# Patient Record
Sex: FEMALE | Race: WHITE | NOT HISPANIC OR LATINO
[De-identification: names, ages, dates, MRNs, and addresses within clinical notes are randomized per-mention and may not be internally consistent; named-entity substitution may affect disease eponyms.]

---

## 2021-01-31 PROBLEM — Z00.00 ENCOUNTER FOR PREVENTIVE HEALTH EXAMINATION: Status: ACTIVE | Noted: 2021-01-31

## 2021-02-02 ENCOUNTER — NON-APPOINTMENT (OUTPATIENT)
Age: 51
End: 2021-02-02

## 2021-02-04 ENCOUNTER — APPOINTMENT (OUTPATIENT)
Dept: ENDOCRINOLOGY | Facility: CLINIC | Age: 51
End: 2021-02-04
Payer: COMMERCIAL

## 2021-02-04 VITALS
TEMPERATURE: 98 F | HEART RATE: 116 BPM | SYSTOLIC BLOOD PRESSURE: 168 MMHG | DIASTOLIC BLOOD PRESSURE: 110 MMHG | OXYGEN SATURATION: 97 %

## 2021-02-04 VITALS — HEIGHT: 60.83 IN | WEIGHT: 242.58 LBS | BODY MASS INDEX: 45.8 KG/M2

## 2021-02-04 DIAGNOSIS — Z84.1 FAMILY HISTORY OF DISORDERS OF KIDNEY AND URETER: ICD-10-CM

## 2021-02-04 DIAGNOSIS — Z80.0 FAMILY HISTORY OF MALIGNANT NEOPLASM OF DIGESTIVE ORGANS: ICD-10-CM

## 2021-02-04 DIAGNOSIS — Z82.3 FAMILY HISTORY OF STROKE: ICD-10-CM

## 2021-02-04 DIAGNOSIS — Z77.22 CONTACT WITH AND (SUSPECTED) EXPOSURE TO ENVIRONMENTAL TOBACCO SMOKE (ACUTE) (CHRONIC): ICD-10-CM

## 2021-02-04 DIAGNOSIS — Z83.3 FAMILY HISTORY OF DIABETES MELLITUS: ICD-10-CM

## 2021-02-04 DIAGNOSIS — Z82.49 FAMILY HISTORY OF ISCHEMIC HEART DISEASE AND OTHER DISEASES OF THE CIRCULATORY SYSTEM: ICD-10-CM

## 2021-02-04 DIAGNOSIS — Z78.9 OTHER SPECIFIED HEALTH STATUS: ICD-10-CM

## 2021-02-04 DIAGNOSIS — Z83.79 FAMILY HISTORY OF OTHER DISEASES OF THE DIGESTIVE SYSTEM: ICD-10-CM

## 2021-02-04 DIAGNOSIS — Z86.39 PERSONAL HISTORY OF OTHER ENDOCRINE, NUTRITIONAL AND METABOLIC DISEASE: ICD-10-CM

## 2021-02-04 PROCEDURE — 99205 OFFICE O/P NEW HI 60 MIN: CPT | Mod: 25

## 2021-02-04 PROCEDURE — 99072 ADDL SUPL MATRL&STAF TM PHE: CPT

## 2021-02-05 LAB
BASOPHILS # BLD AUTO: 0.03 K/UL
BASOPHILS NFR BLD AUTO: 0.4 %
EOSINOPHIL # BLD AUTO: 0.07 K/UL
EOSINOPHIL NFR BLD AUTO: 0.9 %
HCT VFR BLD CALC: 43.9 %
HGB BLD-MCNC: 13.8 G/DL
IMM GRANULOCYTES NFR BLD AUTO: 0.3 %
LYMPHOCYTES # BLD AUTO: 2.32 K/UL
LYMPHOCYTES NFR BLD AUTO: 30.8 %
MAN DIFF?: NORMAL
MCHC RBC-ENTMCNC: 27.4 PG
MCHC RBC-ENTMCNC: 31.4 GM/DL
MCV RBC AUTO: 87.1 FL
MONOCYTES # BLD AUTO: 0.4 K/UL
MONOCYTES NFR BLD AUTO: 5.3 %
NEUTROPHILS # BLD AUTO: 4.69 K/UL
NEUTROPHILS NFR BLD AUTO: 62.3 %
PLATELET # BLD AUTO: 289 K/UL
RBC # BLD: 5.04 M/UL
RBC # FLD: 13.1 %
WBC # FLD AUTO: 7.53 K/UL

## 2021-02-06 LAB
ALBUMIN SERPL ELPH-MCNC: 4.6 G/DL
ALP BLD-CCNC: 128 U/L
ALT SERPL-CCNC: 16 U/L
ANION GAP SERPL CALC-SCNC: 15 MMOL/L
AST SERPL-CCNC: 12 U/L
BILIRUB SERPL-MCNC: 0.3 MG/DL
BUN SERPL-MCNC: 12 MG/DL
CALCIUM SERPL-MCNC: 9.6 MG/DL
CHLORIDE SERPL-SCNC: 103 MMOL/L
CHOLEST SERPL-MCNC: 249 MG/DL
CO2 SERPL-SCNC: 22 MMOL/L
CREAT SERPL-MCNC: 0.71 MG/DL
GLUCOSE SERPL-MCNC: 176 MG/DL
HDLC SERPL-MCNC: 50 MG/DL
LDLC SERPL CALC-MCNC: 179 MG/DL
NONHDLC SERPL-MCNC: 199 MG/DL
POTASSIUM SERPL-SCNC: 4.5 MMOL/L
PROT SERPL-MCNC: 7 G/DL
SODIUM SERPL-SCNC: 140 MMOL/L
T3 SERPL-MCNC: 142 NG/DL
T4 FREE SERPL-MCNC: 1.1 NG/DL
THYROGLOB AB SERPL-ACNC: <20 IU/ML
THYROPEROXIDASE AB SERPL IA-ACNC: 215 IU/ML
TRIGL SERPL-MCNC: 97 MG/DL
TSH SERPL-ACNC: 0.75 UIU/ML
VIT B12 SERPL-MCNC: 489 PG/ML

## 2021-02-07 PROBLEM — Z83.3 FAMILY HISTORY OF DIABETES MELLITUS: Status: ACTIVE | Noted: 2021-02-04

## 2021-02-07 PROBLEM — Z77.22 HISTORY OF SECOND HAND SMOKE EXPOSURE: Status: ACTIVE | Noted: 2021-02-04

## 2021-02-07 PROBLEM — Z84.1 FAMILY HISTORY OF KIDNEY DISEASE: Status: ACTIVE | Noted: 2021-02-04

## 2021-02-07 PROBLEM — Z82.49 FAMILY HISTORY OF MYOCARDIAL INFARCTION: Status: ACTIVE | Noted: 2021-02-04

## 2021-02-07 PROBLEM — Z80.0 FAMILY HISTORY OF MALIGNANT NEOPLASM OF COLON: Status: ACTIVE | Noted: 2021-02-04

## 2021-02-07 PROBLEM — Z86.39 HISTORY OF DIABETES MELLITUS: Status: RESOLVED | Noted: 2021-02-04 | Resolved: 2021-02-07

## 2021-02-07 PROBLEM — Z84.1 FAMILY HISTORY OF RENAL FAILURE: Status: ACTIVE | Noted: 2021-02-04

## 2021-02-07 PROBLEM — Z82.3 FAMILY HISTORY OF CEREBROVASCULAR ACCIDENT (CVA): Status: ACTIVE | Noted: 2021-02-04

## 2021-02-07 PROBLEM — Z82.49 FAMILY HISTORY OF HYPERTENSION: Status: ACTIVE | Noted: 2021-02-04

## 2021-02-07 PROBLEM — Z83.79 FAMILY HISTORY OF CHRONIC LIVER DISEASE: Status: ACTIVE | Noted: 2021-02-04

## 2021-02-07 PROBLEM — Z78.9 NON-SMOKER: Status: ACTIVE | Noted: 2021-02-04

## 2021-02-07 RX ORDER — METFORMIN HYDROCHLORIDE 500 MG/1
500 TABLET, COATED ORAL
Refills: 0 | Status: ACTIVE | COMMUNITY

## 2021-02-07 NOTE — REVIEW OF SYSTEMS
[Recent Weight Gain (___ Lbs)] : recent weight gain: [unfilled] lbs [Hair Loss] : hair loss [Stress] : stress [All other systems negative] : All other systems negative

## 2021-02-08 LAB
ESTIMATED AVERAGE GLUCOSE: 192 MG/DL
HBA1C MFR BLD HPLC: 8.3 %
PANC ISLET CELL AB SER QL: NORMAL
TSI ACT/NOR SER: 0.74 IU/L

## 2021-02-11 DIAGNOSIS — E11.65 TYPE 2 DIABETES MELLITUS WITH HYPERGLYCEMIA: ICD-10-CM

## 2021-02-12 ENCOUNTER — NON-APPOINTMENT (OUTPATIENT)
Age: 51
End: 2021-02-12

## 2021-02-12 LAB
GAD65 AB SER-MCNC: 0.06 NMOL/L
ISLET CELL512 AB SER-SCNC: 0 NMOL/L

## 2021-02-12 RX ORDER — METHIMAZOLE 5 MG/1
5 TABLET ORAL
Qty: 45 | Refills: 0 | Status: COMPLETED | COMMUNITY
Start: 1900-01-01 | End: 2021-05-13

## 2021-02-12 NOTE — REASON FOR VISIT
[Consultation] : a consultation visit [Hyperthyroidism] : hyperthyroidism [DM Type 2] : DM Type 2 [FreeTextEntry2] : Dr. Can

## 2021-02-12 NOTE — ASSESSMENT
[FreeTextEntry1] : This is a 50-year-old female with type 2 diabetes mellitus, hyperlipidemia, morbid obesity, hyperthyroidism, COVID-19 infection, here for consultation.\par 1.  T2DM\par Check hemoglobin A1c.\par Check vitamin B12 as she is on Metformin.\par She would benefit from GLP-1 agonist for potential benefit of weight loss.  She would like to remain on only Metformin at this time.\par She does not want an appointment with CDE at this time.\par Urine microalbumin was recently normal.  She denies nephropathy.\par She denies neuropathy.\par Her last ophthalmology exam was in 2019 and she denies retinopathy.  She was advised to make a follow-up appointment.\par 2.  Hyperlipidemia\par LDL has been markedly elevated in the past.  Check lipid panel.\par She refuses statin at this time.\par 3.  Morbid obesity\par Lifestyle modification.\par 4.  Hypothyroidism\par Likely due to Graves' disease.  Check TSI antibodies.\par Check TFTs.\par Thyroid ultrasound in the past showed scattered subcentimeter thyroid nodules.  Most recent thyroid ultrasound showed heterogeneous thyroid gland without discrete focal nodule.\par She is on methimazole 5 mg daily.  Check CBC and CMP.\par Definitive treatment of hyperthyroidism reviewed.\par She does not wish to undergo WILDE at this time.\par She is considering thyroidectomy.\par Referred to Dr. Rao.

## 2021-02-12 NOTE — CONSULT LETTER
[Dear  ___] : Dear  [unfilled], [Consult Letter:] : I had the pleasure of evaluating your patient, [unfilled]. [Please see my note below.] : Please see my note below. [Consult Closing:] : Thank you very much for allowing me to participate in the care of this patient.  If you have any questions, please do not hesitate to contact me. [Sincerely,] : Sincerely, [FreeTextEntry3] : Liberty Taylor MD, FACE\par

## 2021-02-12 NOTE — HISTORY OF PRESENT ILLNESS
[FreeTextEntry1] : CC: Hyperthyroidism\par This is a 50-year-old female with type 2 diabetes mellitus, hyperlipidemia, morbid obesity, hyperthyroidism, COVID-19 infection, here for consultation.\par Type 2 diabetes was diagnosed at the age of 48.  She is currently on metformin 500 mg daily.  She reports that she started shaking when she increased her dose further.  She denies a history of gestational diabetes. She does not SMBG at home. \par Her last ophthalmology exam was in 2019 and she denies retinopathy.  She denies neuropathy.  She denies nephropathy.\par She was diagnosed with hyperthyroidism in 2008.  Thyroid uptake and scan on November 6, 2008 showed 24-hour uptake 48.4%, heterogeneous thyroid with areas of increased activity with adjacent areas of decreased activity suggestive of multinodular goiter.  She was treated with methimazole for approximately 1 year at that time.  She reports that she developed hyperthyroidism after experiencing extreme stress at work.\par Hyperthyroidism recurred in 2015 and she took methimazole for approximately 1 year at that time as well.  She reports that she lost both parents within 1 year which precipitated hyperthyroidism.\par She had COVID-19 infection in March 2020 and developed hyperthyroidism in June 2020.  She is now on methimazole 5 mg daily.\par

## 2021-02-12 NOTE — PHYSICAL EXAM
[Alert] : alert [Well Nourished] : well nourished [Healthy Appearance] : healthy appearance [Obese] : obese [No Acute Distress] : no acute distress [Well Developed] : well developed [Normal Voice/Communication] : normal voice communication [Normal Sclera/Conjunctiva] : normal sclera/conjunctiva [No Proptosis] : no proptosis [No Neck Mass] : no neck mass was observed [No LAD] : no lymphadenopathy [Supple] : the neck was supple [Thyroid Not Enlarged] : the thyroid was not enlarged [No Thyroid Nodules] : no palpable thyroid nodules [No Respiratory Distress] : no respiratory distress [No Accessory Muscle Use] : no accessory muscle use [Normal Rate] : heart rate was normal [Normal Rate and Effort] : normal respiratory rate and effort [No Edema] : no peripheral edema [Pedal Pulses Normal] : the pedal pulses are present [No Stigmata of Cushings Syndrome] : no stigmata of Cushings Syndrome [Normal Gait] : normal gait [No Clubbing, Cyanosis] : no clubbing  or cyanosis of the fingernails [No Involuntary Movements] : no involuntary movements were seen [Right Foot Was Examined] : right foot ~C was examined [Left Foot Was Examined] : left foot ~C was examined [Normal] : normal [2+] : 2+ in the dorsalis pedis [No Tremors] : no tremors [Normal Sensation on Monofilament Testing] : normal sensation on monofilament testing of lower extremities [Normal Affect] : the affect was normal [Normal Insight/Judgement] : insight and judgment were intact [Normal Mood] : the mood was normal [Acanthosis Nigricans] : no acanthosis nigricans [Diminished Throughout Both Feet] : normal tactile sensation with monofilament testing throughout both feet

## 2021-02-15 LAB — ZINC TRANSPORTER 8 AB: <15 U/ML

## 2021-03-08 ENCOUNTER — APPOINTMENT (OUTPATIENT)
Dept: ENDOCRINOLOGY | Facility: CLINIC | Age: 51
End: 2021-03-08
Payer: COMMERCIAL

## 2021-03-08 VITALS
WEIGHT: 237.41 LBS | HEART RATE: 95 BPM | SYSTOLIC BLOOD PRESSURE: 135 MMHG | BODY MASS INDEX: 46 KG/M2 | TEMPERATURE: 97.1 F | DIASTOLIC BLOOD PRESSURE: 89 MMHG | HEIGHT: 60.24 IN | OXYGEN SATURATION: 98 %

## 2021-03-08 DIAGNOSIS — E13.9 OTHER SPECIFIED DIABETES MELLITUS W/OUT COMPLICATIONS: ICD-10-CM

## 2021-03-08 DIAGNOSIS — E78.5 HYPERLIPIDEMIA, UNSPECIFIED: ICD-10-CM

## 2021-03-08 DIAGNOSIS — E66.01 MORBID (SEVERE) OBESITY DUE TO EXCESS CALORIES: ICD-10-CM

## 2021-03-08 LAB
ALBUMIN SERPL ELPH-MCNC: 4.6 G/DL
ALP BLD-CCNC: 120 U/L
ALT SERPL-CCNC: 15 U/L
ANION GAP SERPL CALC-SCNC: 11 MMOL/L
AST SERPL-CCNC: 16 U/L
BASOPHILS # BLD AUTO: 0.02 K/UL
BASOPHILS NFR BLD AUTO: 0.3 %
BILIRUB SERPL-MCNC: 0.4 MG/DL
BUN SERPL-MCNC: 13 MG/DL
C PEPTIDE SERPL-MCNC: 4.6 NG/ML
CALCIUM SERPL-MCNC: 9.9 MG/DL
CHLORIDE SERPL-SCNC: 103 MMOL/L
CO2 SERPL-SCNC: 27 MMOL/L
CREAT SERPL-MCNC: 0.73 MG/DL
EOSINOPHIL # BLD AUTO: 0.08 K/UL
EOSINOPHIL NFR BLD AUTO: 1.1 %
GLUCOSE SERPL-MCNC: 143 MG/DL
HCT VFR BLD CALC: 44 %
HGB BLD-MCNC: 14 G/DL
IMM GRANULOCYTES NFR BLD AUTO: 0.3 %
LYMPHOCYTES # BLD AUTO: 2.63 K/UL
LYMPHOCYTES NFR BLD AUTO: 35.4 %
MAN DIFF?: NORMAL
MCHC RBC-ENTMCNC: 27.3 PG
MCHC RBC-ENTMCNC: 31.8 GM/DL
MCV RBC AUTO: 85.9 FL
MONOCYTES # BLD AUTO: 0.43 K/UL
MONOCYTES NFR BLD AUTO: 5.8 %
NEUTROPHILS # BLD AUTO: 4.24 K/UL
NEUTROPHILS NFR BLD AUTO: 57.1 %
PLATELET # BLD AUTO: 277 K/UL
POTASSIUM SERPL-SCNC: 4.7 MMOL/L
PROT SERPL-MCNC: 7.3 G/DL
RBC # BLD: 5.12 M/UL
RBC # FLD: 12.6 %
SODIUM SERPL-SCNC: 140 MMOL/L
T4 FREE SERPL-MCNC: 1.1 NG/DL
TSH SERPL-ACNC: 0.48 UIU/ML
WBC # FLD AUTO: 7.42 K/UL

## 2021-03-08 PROCEDURE — 99213 OFFICE O/P EST LOW 20 MIN: CPT | Mod: 25

## 2021-03-08 PROCEDURE — 99072 ADDL SUPL MATRL&STAF TM PHE: CPT

## 2021-03-09 PROBLEM — E66.01 MORBID OBESITY WITH BMI OF 45.0-49.9, ADULT: Status: ACTIVE | Noted: 2021-02-07

## 2021-03-09 PROBLEM — E78.5 HYPERLIPIDEMIA: Status: ACTIVE | Noted: 2021-02-06

## 2021-03-09 PROBLEM — E13.9 LADA (LATENT AUTOIMMUNE DIABETES MELLITUS IN ADULTS): Status: ACTIVE | Noted: 2021-02-11

## 2021-03-09 NOTE — REVIEW OF SYSTEMS
[Recent Weight Loss (___ Lbs)] : recent weight loss: [unfilled] lbs [Dry Skin] : dry skin [All other systems negative] : All other systems negative

## 2021-03-09 NOTE — PHYSICAL EXAM
[Alert] : alert [Well Nourished] : well nourished [Healthy Appearance] : healthy appearance [Obese] : obese [No Acute Distress] : no acute distress [Well Developed] : well developed [Normal Voice/Communication] : normal voice communication [Normal Sclera/Conjunctiva] : normal sclera/conjunctiva [No Proptosis] : no proptosis [No Neck Mass] : no neck mass was observed [No LAD] : no lymphadenopathy [Supple] : the neck was supple [Thyroid Not Enlarged] : the thyroid was not enlarged [No Thyroid Nodules] : no palpable thyroid nodules [No Respiratory Distress] : no respiratory distress [No Accessory Muscle Use] : no accessory muscle use [Normal Rate and Effort] : normal respiratory rate and effort [Normal Rate] : heart rate was normal [No Edema] : no peripheral edema [Pedal Pulses Normal] : the pedal pulses are present [No Stigmata of Cushings Syndrome] : no stigmata of Cushings Syndrome [Normal Gait] : normal gait [No Clubbing, Cyanosis] : no clubbing  or cyanosis of the fingernails [No Involuntary Movements] : no involuntary movements were seen [Acanthosis Nigricans] : no acanthosis nigricans [Right Foot Was Examined] : right foot ~C was examined [Left Foot Was Examined] : left foot ~C was examined [Normal] : normal [2+] : 2+ in the dorsalis pedis [Diminished Throughout Both Feet] : normal tactile sensation with monofilament testing throughout both feet [No Tremors] : no tremors [Normal Sensation on Monofilament Testing] : normal sensation on monofilament testing of lower extremities [Normal Affect] : the affect was normal [Normal Insight/Judgement] : insight and judgment were intact [Normal Mood] : the mood was normal

## 2021-03-09 NOTE — ASSESSMENT
[FreeTextEntry1] : This is a 50-year-old female with RAHEEM, hyperlipidemia, morbid obesity, Hashimoto's thyroiditis, Graves' disease, hyperthyroidism, COVID-19 infection, here for consultation.\par 1.  Hyperthyroidism due to Graves' disease\par Check TFTs.\par Thyroid ultrasound in the past showed scattered subcentimeter thyroid nodules.  Most recent thyroid ultrasound showed heterogeneous thyroid gland without discrete focal nodule.\par She is on methimazole 5 mg every other day.  Check CBC and CMP.\par Definitive treatment of hyperthyroidism reviewed.\par She does not wish to undergo WILDE or thyroidectomy at this time.  She prefers to remain on methimazole for now.  Side effects reviewed.\par 2.  RAHEEM\par Check C-peptide.  \par She prefers to remain on Metformin.  Vitamin B12 level is normal.\par She does not want an appointment with CDE at this time.\par Urine microalbumin was recently normal.  She denies nephropathy.\par She denies neuropathy.\par Her last ophthalmology exam was in 2019 and she denies retinopathy.  She was advised to make a follow-up appointment.\par 2.  Hyperlipidemia\par LDL is markedly elevated.\par She refuses statin at this time.\par 3.  Morbid obesity\par Continue with lifestyle modification.\par

## 2021-03-16 ENCOUNTER — NON-APPOINTMENT (OUTPATIENT)
Age: 51
End: 2021-03-16

## 2021-03-16 LAB — GAD65 AB SER-MCNC: 0.04 NMOL/L

## 2021-03-22 ENCOUNTER — APPOINTMENT (OUTPATIENT)
Dept: ENDOCRINOLOGY | Facility: CLINIC | Age: 51
End: 2021-03-22
Payer: COMMERCIAL

## 2021-03-22 PROCEDURE — G0108 DIAB MANAGE TRN  PER INDIV: CPT

## 2021-03-22 PROCEDURE — 99072 ADDL SUPL MATRL&STAF TM PHE: CPT

## 2021-04-12 ENCOUNTER — APPOINTMENT (OUTPATIENT)
Dept: ENDOCRINOLOGY | Facility: CLINIC | Age: 51
End: 2021-04-12
Payer: COMMERCIAL

## 2021-04-12 VITALS
HEART RATE: 83 BPM | TEMPERATURE: 98.4 F | HEIGHT: 60.24 IN | WEIGHT: 230.03 LBS | DIASTOLIC BLOOD PRESSURE: 86 MMHG | OXYGEN SATURATION: 96 % | SYSTOLIC BLOOD PRESSURE: 132 MMHG | BODY MASS INDEX: 44.57 KG/M2

## 2021-04-12 DIAGNOSIS — R76.8 OTHER SPECIFIED ABNORMAL IMMUNOLOGICAL FINDINGS IN SERUM: ICD-10-CM

## 2021-04-12 DIAGNOSIS — E05.00 THYROTOXICOSIS WITH DIFFUSE GOITER W/OUT THYROTOXIC CRISIS OR STORM: ICD-10-CM

## 2021-04-12 DIAGNOSIS — E06.3 AUTOIMMUNE THYROIDITIS: ICD-10-CM

## 2021-04-12 DIAGNOSIS — E05.90 THYROTOXICOSIS, UNSPECIFIED W/OUT THYROTOXIC CRISIS OR STORM: ICD-10-CM

## 2021-04-12 DIAGNOSIS — I10 ESSENTIAL (PRIMARY) HYPERTENSION: ICD-10-CM

## 2021-04-12 PROCEDURE — 99213 OFFICE O/P EST LOW 20 MIN: CPT | Mod: 25

## 2021-04-12 PROCEDURE — 99072 ADDL SUPL MATRL&STAF TM PHE: CPT

## 2021-04-15 LAB
T3 SERPL-MCNC: 120 NG/DL
T4 FREE SERPL-MCNC: 1 NG/DL
TSH SERPL-ACNC: 0.8 UIU/ML

## 2021-04-16 PROBLEM — E05.00 GRAVES' DISEASE: Status: ACTIVE | Noted: 2021-02-08

## 2021-04-16 PROBLEM — R76.8 POSITIVE GAD ANTIBODY: Status: ACTIVE | Noted: 2021-04-16

## 2021-04-16 PROBLEM — E05.90 HYPERTHYROIDISM: Status: ACTIVE | Noted: 2021-02-04

## 2021-04-16 PROBLEM — E06.3 HASHIMOTO'S THYROIDITIS: Status: ACTIVE | Noted: 2021-02-06

## 2021-04-16 PROBLEM — I10 BENIGN ESSENTIAL HYPERTENSION: Status: ACTIVE | Noted: 2021-04-16

## 2021-04-16 RX ORDER — ROSUVASTATIN CALCIUM 5 MG/1
5 TABLET, FILM COATED ORAL
Refills: 0 | Status: ACTIVE | COMMUNITY
Start: 2021-04-16

## 2021-04-16 RX ORDER — VALSARTAN 160 MG/1
160 TABLET, COATED ORAL
Refills: 0 | Status: ACTIVE | COMMUNITY
Start: 2021-04-16

## 2021-04-16 NOTE — HISTORY OF PRESENT ILLNESS
[FreeTextEntry1] : CC: Hyperthyroidism\par This is a 50-year-old female with diabetes mellitus/RAHEEM, hyperlipidemia, HTN, morbid obesity, Hashimoto's thyroiditis, Graves' disease hyperthyroidism, COVID-19 infection, here for follow-up.\par Diabetes was diagnosed at the age of 48.  She is currently on metformin 1000 mg daily.  She occasionally takes Metformin 2000 mg daily.  She denies a history of gestational diabetes. She does not SMBG at home. SHELLEY abs positive. \par Her last ophthalmology exam was in 2019 and she denies retinopathy.  She denies neuropathy.  She denies nephropathy.\par \par She was diagnosed with hyperthyroidism in 2008.  Thyroid uptake and scan on November 6, 2008 showed 24-hour uptake 48.4%, heterogeneous thyroid with areas of increased activity with adjacent areas of decreased activity suggestive of multinodular goiter.  She was treated with methimazole for approximately 1 year at that time.  She reports that she developed hyperthyroidism after experiencing extreme stress at work.\par Hyperthyroidism recurred in 2015 and she took methimazole for approximately 1 year at that time as well.  She reports that she lost both parents within 1 year which precipitated hyperthyroidism.\par She had COVID-19 infection in March 2020 and developed hyperthyroidism in June 2020.  She is now on methimazole 5 mg  every other day.\par

## 2021-04-16 NOTE — ASSESSMENT
[FreeTextEntry1] : This is a 50-year-old female with RAHEEM, hyperlipidemia, HTN, morbid obesity, Hashimoto's thyroiditis, Graves' disease, hyperthyroidism, COVID-19 infection, here for follow up.\par 1.  Hyperthyroidism due to Graves' disease\par Check TFTs.\par Thyroid ultrasound in the past showed scattered subcentimeter thyroid nodules.  Most recent thyroid ultrasound showed heterogeneous thyroid gland without discrete focal nodule.\par She is on methimazole 5 mg every other day.  Check CBC and CMP.\par Definitive treatment of hyperthyroidism reviewed.\par She does not wish to undergo WILDE or thyroidectomy at this time.  She prefers to remain on methimazole for now. \par 2.  RAHEEM\par She prefers to remain on Metformin.  Vitamin B12 level is normal.\par Urine microalbumin was recently normal.  She denies nephropathy.\par She denies neuropathy.\par Her last ophthalmology exam was in 2019 and she denies retinopathy.  She was advised to make a follow-up appointment.\par 2.  Hyperlipidemia\par LDL is markedly elevated.\par Cont rosuvastatin 5mg qd. \par 3.  Morbid obesity\par Continue with lifestyle modification.\par 4. HTN\par Controlled. \par

## 2021-04-16 NOTE — PHYSICAL EXAM
[Alert] : alert [Well Nourished] : well nourished [Healthy Appearance] : healthy appearance [Obese] : obese [No Acute Distress] : no acute distress [Well Developed] : well developed [Normal Voice/Communication] : normal voice communication [Normal Sclera/Conjunctiva] : normal sclera/conjunctiva [No Proptosis] : no proptosis [No Neck Mass] : no neck mass was observed [No LAD] : no lymphadenopathy [Supple] : the neck was supple [Thyroid Not Enlarged] : the thyroid was not enlarged [No Thyroid Nodules] : no palpable thyroid nodules [No Respiratory Distress] : no respiratory distress [No Accessory Muscle Use] : no accessory muscle use [Normal Rate and Effort] : normal respiratory rate and effort [No Edema] : no peripheral edema [Normal Rate] : heart rate was normal [Pedal Pulses Normal] : the pedal pulses are present [No Stigmata of Cushings Syndrome] : no stigmata of Cushings Syndrome [Normal Gait] : normal gait [No Clubbing, Cyanosis] : no clubbing  or cyanosis of the fingernails [No Involuntary Movements] : no involuntary movements were seen [Acanthosis Nigricans] : no acanthosis nigricans [Left Foot Was Examined] : left foot ~C was examined [Right Foot Was Examined] : right foot ~C was examined [Normal] : normal [2+] : 2+ in the dorsalis pedis [Diminished Throughout Both Feet] : normal tactile sensation with monofilament testing throughout both feet [No Tremors] : no tremors [Normal Sensation on Monofilament Testing] : normal sensation on monofilament testing of lower extremities [Normal Affect] : the affect was normal [Normal Insight/Judgement] : insight and judgment were intact [Normal Mood] : the mood was normal

## 2021-04-19 ENCOUNTER — NON-APPOINTMENT (OUTPATIENT)
Age: 51
End: 2021-04-19

## 2021-06-03 ENCOUNTER — APPOINTMENT (OUTPATIENT)
Dept: INTERNAL MEDICINE | Facility: CLINIC | Age: 51
End: 2021-06-03
Payer: COMMERCIAL

## 2021-06-03 PROCEDURE — 36415 COLL VENOUS BLD VENIPUNCTURE: CPT

## 2021-06-04 ENCOUNTER — NON-APPOINTMENT (OUTPATIENT)
Age: 51
End: 2021-06-04

## 2021-08-17 LAB
T3 SERPL-MCNC: 129 NG/DL
T4 FREE SERPL-MCNC: 1.2 NG/DL
TSH SERPL-ACNC: 0.89 UIU/ML